# Patient Record
Sex: FEMALE | Race: WHITE | Employment: UNEMPLOYED | URBAN - METROPOLITAN AREA
[De-identification: names, ages, dates, MRNs, and addresses within clinical notes are randomized per-mention and may not be internally consistent; named-entity substitution may affect disease eponyms.]

---

## 2019-03-15 ENCOUNTER — HOSPITAL ENCOUNTER (OUTPATIENT)
Dept: PSYCHIATRY | Age: 29
Setting detail: THERAPIES SERIES
Discharge: HOME | End: 2019-03-15
Payer: COMMERCIAL

## 2019-03-15 PROCEDURE — 90791 PSYCH DIAGNOSTIC EVALUATION: CPT

## 2019-03-15 PROCEDURE — 80305 DRUG TEST PRSMV DIR OPT OBS: CPT

## 2019-03-15 ASSESSMENT — LIFESTYLE VARIABLES: HISTORY_ALCOHOL_USE: NO

## 2019-04-01 ENCOUNTER — HOSPITAL ENCOUNTER (OUTPATIENT)
Dept: PSYCHIATRY | Age: 29
Setting detail: THERAPIES SERIES
Discharge: HOME | End: 2019-04-01
Payer: COMMERCIAL

## 2019-04-01 PROCEDURE — 80305 DRUG TEST PRSMV DIR OPT OBS: CPT

## 2019-04-01 PROCEDURE — H0015 ALCOHOL AND/OR DRUG SERVICES: HCPCS

## 2019-04-02 NOTE — PROGRESS NOTES
Group Therapy Note    Date: 4/1/2019   Start Time: 5:30PM   End Time: 8:00PM  Number of Participants: 5  Support Meetings attended:2     Type of Group: [facility]       Topic: Group 1: Group psychotherapy- Thoughts & feelings - Cravings & Urges               Group 2: Substance use disorder and impact on Self -care       Patient's Goal:  Maintain abstinence/ Develop sober support / Manage cravings & urges , eliminate legal issues from [employer]     Group 1 5:30AM to 6:45 : Client actively engaged in group session. She shared that she came into treatment because she got a legal charge and smokes marijuana. Client stated her last use of marijuana was yesterday and states she smoked a blunt because she uses marijuana to relax and to \" chill \". Client denies having cravings & urges to use marijuana and when cravings & urges are explained to her she states \" I smoke weed because I like to smoke weed\" Client denies that her marijuana use is a problem in her life and became very resistant to hearing feedback about her marijuana use the more questions were explored with her.     Group 2 7:00 to 8:00 : Client actively engaged in group discussion exploring a handout and identifying her self care issues. Client identified her physica helath and gaining weight as a self care issues. Client identified getting back into exorcising as one step sh can take to improve her physical health. Client does not see marijuana use as a problem that can impact her physical health. Urinalysis Submitted Today? Yes    Urinalysis Results: 0    If Positive, for which substance:0    Date of Urinalysis Results Review: 0     Urinalysis Results Signed by Client? No    Status After Intervention:  Unchanged    Participation Level:  Active Listener, Interactive and Minimal    Participation Quality: Appropriate, Attentive, Sharing and Resistant      Speech:  normal      Thought Process/Content: Logical      Affective Functioning: Flat      Mood: irritable      Level of consciousness:  Alert, Oriented x4 and Attentive      Response to Learning: Able to verbalize current knowledge/experience, Able to verbalize/acknowledge new learning, Able to retain information, Capable of insight, Able to change behavior and Resistant      Endings: None Reported    Modes of Intervention: Support, Socialization, Exploration, Clarifying, Problem-solving and Confrontation      Discipline Responsible: [facility]      Signature:  Nahun Salinas

## 2019-04-02 NOTE — PLAN OF CARE
Problem: Substance Abuse:  Goal: Patient specific goal  Description  Patient specific goal  4/2/2019 0916 by Ambrocio Dallas RN  Outcome: Ongoing  Note:   Case Management Goal #1                                                                                                                                                                                                                   Charisse Sanders will meet with the terms of her probation throughout 18-24 sessions in treatment, throughout 12 weeks of after care and on discharge. A. Weekly progress reports to [facility] describing progress toward goal achievement and attendance. Time Frame for Achievement: 6/01/19        B. Weekly urine drug screens, review with Client -Becka Camp. Time Frame for Achievement:6/01/19    C. Explore legal charges and identify 2-4 episodes of substance abuse that related to this legal charge or potential legal involvement. Time Frame for Achievement: 6/01/2019 4/2/2019 0908 by Ambrocio Dallas RN  Outcome: Ongoing  Note:   Patient Specific Goal: Charisse Sanders stated that she has \"no goals for treatment\". Goal 1: Charisse Sanders will achieve uninterrupted sobriety throughout 18-24 sessions of treatment, and 12 weeks of after care and on discharge. A. Submit weekly random urine drug screens, Review weekly with Charisse Sanders and Probation Sayra Perez and have sign. Time frame for achievement: 6/01/19    B. Explore 2-3 attempts at sobriety and identify 2-3 behavioral changes that need to be made. Time frame for achievement: 6/01/19    C. Learn about the disease concept of addiction and share 2-3 insights regarding own usage. Time frame for achievement: 6/01/2019    Goal 2: Sangeetha Richardson will improve and develop an extensive sober support network over the next 18-24 sessions of treatment, 12 weeks of after care and on discharge. A. Attend 2 AA/NA meetings per week over the next 18-24 sessions of treatment and 3 times a week in after care. Time frame for achievement: 6/01/19    B. Acquire 2-6 names and phone numbers of recovering people (same gender) met at each AA/NA meeting. Time frame for achievement: 6/01/19    C. Identify 2-3 individuals not supportive of your recovery efforts and discuss in group therapy over the next 18-24 sessions of treatment. Time frame for achievement: 6/01/19    D. Obtain (1) temporary female sponsor and share 2-3 reasons why you selected this individual as a sponsor to aid your recovery process. Time frame for achievement: 6/01/19    Goal 3: Client will learn to cope with cravings and urges over the next 18-24 sessions without using any alcohol or drugs of abuse and addiction. A. Identify 3-5 past experiences with obsessive thoughts of using drugs and alcohol; share during group process. Time frame for achievement: 6/01/2019    B. List 2-4 events/situations that lead to cravings and urges then identify 2-3 changes that need to be made over the next 18-24 sessions.

## 2019-04-03 ENCOUNTER — HOSPITAL ENCOUNTER (OUTPATIENT)
Dept: PSYCHIATRY | Age: 29
Setting detail: THERAPIES SERIES
Discharge: HOME | End: 2019-04-03
Payer: COMMERCIAL

## 2019-04-03 PROCEDURE — H0015 ALCOHOL AND/OR DRUG SERVICES: HCPCS

## 2019-04-04 ENCOUNTER — HOSPITAL ENCOUNTER (OUTPATIENT)
Dept: PSYCHIATRY | Age: 29
Setting detail: THERAPIES SERIES
Discharge: HOME | End: 2019-04-04
Payer: COMMERCIAL

## 2019-04-04 PROCEDURE — H0005 ALCOHOL AND/OR DRUG SERVICES: HCPCS

## 2019-04-04 PROCEDURE — H0015 ALCOHOL AND/OR DRUG SERVICES: HCPCS

## 2019-04-05 NOTE — PROGRESS NOTES
Congruent      Mood: Stable       Level of consciousness:  Alert, Oriented x4 and Attentive      Response to Learning: Able to verbalize current knowledge/experience, Able to verbalize/acknowledge new learning, Able to retain information, Capable of insight, Able to change behavior and Progressing to goal      Endings: None Reported    Modes of Intervention: Education, Support, Socialization, Exploration, Clarifying, Problem-solving and Media      Discipline Responsible: [facility]      Signature:  Monster Mccoy [facility]

## 2019-04-05 NOTE — H&P
[address]                              HISTORY AND PHYSICAL    PATIENT NAME: Manasa Lechuga                    :        1990  MED REC NO:   [id number]                            ROOM:  ACCOUNT NO:   [de-identified]                           ADMIT DATE: 2019  PROVIDER:     Spring Harrell MD    HISTORY OF PRESENT ILLNESS:  A 47-year-old female from the _____, had  been admitted for the chemical dependency as an outpatient program.  The  patient stated that she has been using marijuana for the last 15 to 16  years, had been smoking it almost everyday. She got into problem with  the police and had to get a drug screen done, which did show positive  marijuana, had been sent in for evaluation and treatment. Does not have  any other major medical problems and no treatment in the past.  She  states that she does not have any psychiatric problems, has not seen the  psychiatrist and not took any psych medications. REVIEW OF SYSTEMS:  HEENT:  Normal.  HEART:  Normal.  LUNGS:  Normal.   ABDOMEN:  Normal.  EXTREMITIES:  Normal.  NEUROLOGIC:  Normal.    FAMILY HISTORY:  No relevant family history. SOCIAL HISTORY:  No relevant social history. PAST MEDICAL HISTORY:  No relevant past medical history. PAST SURGICAL HISTORY:  No relevant surgical history. PHYSICAL EXAMINATION:  GENERAL:  At the time of the admission, the patient is alert and  conscious, in no acute distress, well developed, well nourished. No  signs of anemia. VITAL SIGNS:  Temperature is normal, pulse is 80 per minute,  respirations are 20 per minute, and blood pressure is 120/80. HEENT:  Within normal limits. HEART:  Normal sinus rhythm. No audible murmurs. LUNGS:  Clear, fair, equal, and good air entry. ABDOMEN:  Soft. No organomegaly. No masses. EXTREMITIES:  Within normal limits.   NEUROLOGIC:  The patient is stable. DIAGNOSTIC IMPRESSION:  Chemical dependency. PLAN AND TREATMENT:  The patient is physically able to complete the  treatment program and she does not have any problem with any of the  assessments. Rehabilitation as per the counselor. Aftercare as per the  counselor. Followup with the family physician.         Justo Curran MD    D: 04/04/2019 18:22:40       T: 04/04/2019 20:31:54     [email or web address]  Job#: [id number]     Doc#: [id number]    CC:

## 2019-04-08 ENCOUNTER — HOSPITAL ENCOUNTER (OUTPATIENT)
Dept: PSYCHIATRY | Age: 29
Setting detail: THERAPIES SERIES
Discharge: HOME | End: 2019-04-08
Payer: COMMERCIAL

## 2019-04-08 PROCEDURE — H0015 ALCOHOL AND/OR DRUG SERVICES: HCPCS

## 2019-04-09 NOTE — PROGRESS NOTES
Group Therapy Note    Date: 4/8/2019   Start Time: 5;30PM   End Time: 8:00PM  Number of Participants: 4  Support Meetings attended:2     Type of Group: [facility]      Topic: Group 1: Group Psychotherapy                 Group  2: Developing skills to be emotionally resilient     Patient's Goal:  Maintain abstinence/ Develop sober support / Manage cravings & urges/ Eliminate legal problems from [employer].     Group 1 5:30 to 6:45: Client actively opened in group therapy sharing that she is still frustrated with her boyfriend actions and she shared that he went to a meeting with her over the weekend , however she states she did not share at the support group meeting because her boyfriend wanted to force her to share and she did not feel comfortable. Counsleor explored with client that going to meeting without her boyfriend may be more helpful for her recovery.       Group 2 7:00 to 8:00PM: Client actively identified and shared how changing her perception about her Loralyn Le as a coping skills that  is helping her to accept her treatment and do what she needs to do for herself to complete the program        Urinalysis Submitted Today? No    Urinalysis Results: 0    If Positive, for which substance:0    Date of Urinalysis Results Review: 0     Urinalysis Results Signed by Client? No    Status After Intervention:  Improved    Participation Level:  Active Listener, Interactive and Minimal    Participation Quality: Appropriate, Attentive and Sharing      Speech:  normal      Thought Process/Content: Logical      Affective Functioning: Congruent      Mood: Stable       Level of consciousness:  Alert, Oriented x4 and Attentive      Response to Learning: Able to verbalize current knowledge/experience, Able to verbalize/acknowledge new learning, Able to retain information, Capable of insight and Able to change behavior      Endings: None Reported    Modes of Intervention: Support, Socialization, Exploration, Clarifying and Problem-solving      Discipline Responsible: [facility]      Signature:  Nahun Cali

## 2019-04-10 ENCOUNTER — HOSPITAL ENCOUNTER (OUTPATIENT)
Dept: PSYCHIATRY | Age: 29
Setting detail: THERAPIES SERIES
Discharge: HOME | End: 2019-04-10
Payer: COMMERCIAL

## 2019-04-10 PROCEDURE — H0015 ALCOHOL AND/OR DRUG SERVICES: HCPCS

## 2019-04-11 ENCOUNTER — HOSPITAL ENCOUNTER (OUTPATIENT)
Dept: PSYCHIATRY | Age: 29
Setting detail: THERAPIES SERIES
Discharge: HOME | End: 2019-04-11
Payer: COMMERCIAL

## 2019-04-11 PROCEDURE — H0015 ALCOHOL AND/OR DRUG SERVICES: HCPCS

## 2019-04-11 PROCEDURE — 80305 DRUG TEST PRSMV DIR OPT OBS: CPT

## 2019-04-12 NOTE — PROGRESS NOTES
Group Therapy Note      Date: 4/11/2019    Start Time: 5:30 PM   End Time: 8:00PM  Number of Participants: 4  Support Meetings attended:0     Type of Group: IOP       Topic: Group 1  Group  Psychotherapy                Group  2: Values and Personal responsibility      Patient's Goal:  Maintain abstinence/ Develop sober support / Manage cravings & urges/ Eliminate legal problems from [employer].     Group 1 5:30 to 6:45PM: Client actively participated in group discussion and she shared her challenges this week in recovery are learning how to stay focused on her goal of getting back to her family in [place] by staying sober , and not allowing her living situation get the best of her. Client reports that going to work and coming to treatment is helping her to provide structure in her life, and made her think about her past negative lifestyle that she needs to change.     Group 2 7:00 to 8:00PM: Client actively participated in group discussion and she identified that she values her children. She explored her feelings of past anger and resentments have caused her to make poor choices that led to substance use. She he identified keeping his sobriety is taking personal responsibility to aid his recovery and is a  benefit his family. Client presented pleasant and engaged      Urinalysis Submitted Today? Yes    Urinalysis Results: positive    If Positive, for which substance:marijuana with decreasing level     Date of Urinalysis Results Review: 4/1/2019      Urinalysis Results Signed by Client? Yes    Status After Intervention:  Improved    Participation Level:  Active Listener and Interactive    Participation Quality: Appropriate, Attentive and Sharing      Speech:  normal      Thought Process/Content: Logical      Affective Functioning: Congruent      Mood: Stable       Level of consciousness:  Alert, Oriented x4 and Attentive      Response to Learning: Able to

## 2019-04-15 ENCOUNTER — HOSPITAL ENCOUNTER (OUTPATIENT)
Dept: PSYCHIATRY | Age: 29
Setting detail: THERAPIES SERIES
Discharge: HOME | End: 2019-04-15
Payer: COMMERCIAL

## 2019-04-15 PROCEDURE — H0015 ALCOHOL AND/OR DRUG SERVICES: HCPCS

## 2019-04-16 NOTE — PROGRESS NOTES
Group Therapy Note    Date: 4/15/2019    Start Time: 5:30PM   End Time: 8:00PM  Number of Participants: 5  Support Meetings attended:2     Type of Group: IOP      Topic: Group 1 Group Psychotherapy               Group  2 Identifying Skills to Prevent relapse       Patient's Goal:  Maintain abstinence/ Develop sober support / Manage cravings & urges/ Eliminate legal problems from [employer].     Group 1 5:30 to 6:30PM: Benjamin Miller actively participated in group therapy she shared how not smoking marijuana is allowing her to have clearer  Thoughts. She stated she is developing a  more positive outlook on her life and realizing that marijuana is not more important that her responsiblities Trevor Sneed presented appropriate and calm     Group 2 6:50 to 8:00PM: Benjamin Miller actively participated in group therapy session and gained insight from education. She identified not associating with using peers and focusing on herself by taking care of her responsibilities as coping skills to aid her recovery. Status After Intervention:  Improved    Participation Level:  Active Listener, Interactive and Minimal    Participation Quality: Appropriate, Attentive and Sharing      Speech:  normal      Thought Process/Content: Logical      Affective Functioning: Congruent      Mood: Stable       Level of consciousness:  Alert, Oriented x4 and Attentive      Response to Learning: Able to verbalize current knowledge/experience, Able to verbalize/acknowledge new learning, Able to retain information, Capable of insight, Able to change behavior and Progressing to goal      Endings: None Reported    Modes of Intervention: Education, Support, Socialization, Exploration, Clarifying, Problem-solving and Media      Discipline Responsible: [facility]      Signature:  Nahun James

## 2019-04-17 ENCOUNTER — HOSPITAL ENCOUNTER (OUTPATIENT)
Dept: PSYCHIATRY | Age: 29
Setting detail: THERAPIES SERIES
Discharge: HOME | End: 2019-04-17
Payer: COMMERCIAL

## 2019-04-17 PROCEDURE — H0015 ALCOHOL AND/OR DRUG SERVICES: HCPCS

## 2019-04-18 ENCOUNTER — HOSPITAL ENCOUNTER (OUTPATIENT)
Dept: PSYCHIATRY | Age: 29
Setting detail: THERAPIES SERIES
Discharge: HOME | End: 2019-04-18
Payer: COMMERCIAL

## 2019-04-18 PROCEDURE — 80305 DRUG TEST PRSMV DIR OPT OBS: CPT

## 2019-04-18 PROCEDURE — H0015 ALCOHOL AND/OR DRUG SERVICES: HCPCS

## 2019-04-18 NOTE — GROUP NOTE
Number of participants:2  Type of group: Recovery  Mode of intervention: Exploration, Clarifying, Problem-solving, Confrontation and Reality-testing  Topic: Stages of Recovery   Objective: To assist client with identifying what stage of recovery they are in and lifestyle changes that need improved to get to the next stage of recovery . Note: Aurelio Neumann identified being in abstinence phase of recovery and explored that she is seeing results in her drug screens and identified that one change she is making to get to the lifestyle phase is making better choices with relationships by eliminating using peers. Status after intervention:Improved  Participation level:  Active Listener and Interactive  Participation Quality: Appropriate, Attentive and Sharing  Speech: normal  Thought Process/Content:Logical  Mood/Affect: calm and congruent  Self report: None Reported  Response to learning: Able to verbalize current knowledge/experience, Able to verbalize/acknowledge new learning, Able to retain information, Capable of insight, Able to change behavior and Progressing to goal  Discipline Responsible: [place]

## 2019-04-18 NOTE — GROUP NOTE
Number of participants:2  Type of group: Psychotherapy  Mode of intervention: Support, Exploration, Clarifying, Problem-solving, Confrontation and Reality-testing  Topic: Challenges in Relationships that impact Recovery   Objective: To assist client with developing conflict resolution skills to reduce stress on recovery process and aid improving relationships harmed by addiction    Note: Padmini Antonio shared  feelings of stress related to boyfriends infidelity. Padmini Antonio processed and explored alternative options and choices she can  apply to aid making positive choices to support her recovery process. Status after intervention:Improved  Participation level:  Active Listener and Interactive  Participation Quality: Appropriate, Attentive and Sharing  Speech: normal  Thought Process/Content:Logical  Mood/Affect: irritable and congruent  Self report: None Reported  Response to learning: Able to verbalize current knowledge/experience, Able to verbalize/acknowledge new learning, Able to retain information, Capable of insight, Able to change behavior and Progressing to goal  Discipline Responsible: [place]

## 2019-04-19 NOTE — GROUP NOTE
Number of participants:2  Type of group: Psychotherapy  Mode of intervention: Support, Exploration, Clarifying, Problem-solving, Confrontation and Reality-testing  Topic: Character defects that hinder recovery process  Objective: To assist client with identifying personality defects and behaviors that hinder recovery process and gain insight and knowledge to change the behaviors. Note: Jaja Gamez actively engaged in group session and was able to identify how feelings of anger and being vengeful and defects that hinder her recovery efforts and influence poor choices. Susi Mccormick explored 2-3 new positive coping skills to utlize when react to angry feelings. Status after intervention:Improved  Participation level:  Active Listener and Interactive  Participation Quality: Appropriate, Attentive and Sharing  Speech: normal  Thought Process/Content:Logical  Mood/Affect: congruent  Self report: None Reported  Response to learning: Able to verbalize current knowledge/experience, Able to verbalize/acknowledge new learning, Able to retain information, Capable of insight, Able to change behavior and Progressing to goal  Discipline Responsible: [place]

## 2019-04-22 ENCOUNTER — HOSPITAL ENCOUNTER (OUTPATIENT)
Dept: PSYCHIATRY | Age: 29
Setting detail: THERAPIES SERIES
Discharge: HOME | End: 2019-04-22
Payer: COMMERCIAL

## 2019-04-22 PROCEDURE — 80305 DRUG TEST PRSMV DIR OPT OBS: CPT

## 2019-04-22 PROCEDURE — H0015 ALCOHOL AND/OR DRUG SERVICES: HCPCS

## 2019-04-23 NOTE — GROUP NOTE
Number of participants:3  Type of group: Relapse Prevention   Mode of intervention: Support, Exploration, Clarifying, Media, Problem-solving, and Reality-testing  Topic: Peer Influence    Objective: To become aware of peer influence and explore how negative peers can hinder recovery efforts. Note: Linnea Gallegos actively engaged in group session. She presented appropriate and cooperative. Linnea Gallegos identified and explored how being with peers that were negative caused her to make poor choices that led to drug use and criminal activity . Linnea Gallegos stated  That she can't keep taking her children through poverty because of her choices of not being responsible. Status after intervention:Improved  Participation level:  Active Listener and Interactive  Participation Quality: Appropriate, Attentive and Sharing  Speech: normal  Thought Process/Content:Logical  Mood/Affect: congruent  Self report: None Reported  Response to learning: Able to verbalize current knowledge/experience, Able to verbalize/acknowledge new learning, Able to retain information, Capable of insight, Able to change behavior and Progressing to goal  Discipline Responsible: [place]

## 2019-04-23 NOTE — GROUP NOTE
Number of participants:3  Type of group: Psychotherapy  Mode of intervention: Support, Exploration, Clarifying, Problem-solving, Confrontation and Reality-testing  Topic: Managing daily life stressors in recovery   Objective: To practice communicating daily stressors and develop coping skills to manage without substance use    Note: Brad Schrader actively engaged in group session and she reports that she and her significant other were getting along and she is learning to cope with her stressors at home by walking away from arguments, or keeping busy with going to work , and finding other things to occupy her mind to avoid conflicts and reduce daily stress in her life. Status after intervention:Improved  Participation level:  Active Listener and Interactive  Participation Quality: Appropriate, Attentive and Sharing  Speech: normal  Thought Process/Content:Logical  Mood/Affect: congruent  Self report: None Reported  Response to learning: Able to verbalize current knowledge/experience, Able to verbalize/acknowledge new learning, Able to retain information, Capable of insight, Able to change behavior and Progressing to goal  Discipline Responsible: [place]

## 2019-04-24 ENCOUNTER — HOSPITAL ENCOUNTER (OUTPATIENT)
Dept: PSYCHIATRY | Age: 29
Setting detail: THERAPIES SERIES
Discharge: HOME | End: 2019-04-24
Payer: COMMERCIAL

## 2019-04-24 PROCEDURE — H0015 ALCOHOL AND/OR DRUG SERVICES: HCPCS

## 2019-04-24 NOTE — GROUP NOTE
Number of participants:4  Type of group: Psychotherapy  Mode of intervention: Support, Exploration, Clarifying, Problem-solving and Reality-testing  Topic: Psychotherapy   Objective: To explore uncomfortable challenges in recovery process to gain growth and explore strengths in recovery process. Note: Suly Chase actively participated in group session and she presented calm and appropriate. Kishor Sheehan identified and explored that growth in her recovery would be removing herself from a toxic relationship . Suly Chase identified how the relationship has been negative , but states she is not ready to let go because she does not want to be alone. Counselor processed benefits to recovery removing toxi relationship. Status after intervention:Unchanged  Participation level:  Active Listener and Interactive  Participation Quality: Appropriate, Attentive and Sharing  Speech: normal  Thought Process/Content:Logical  Mood/Affect: calm and congruent  Self report: None Reported  Response to learning: Able to verbalize current knowledge/experience, Able to verbalize/acknowledge new learning, Able to retain information, Capable of insight, Able to change behavior and Resistant  Discipline Responsible: [place]

## 2019-04-25 ENCOUNTER — HOSPITAL ENCOUNTER (OUTPATIENT)
Dept: PSYCHIATRY | Age: 29
Setting detail: THERAPIES SERIES
Discharge: HOME | End: 2019-04-25
Payer: COMMERCIAL

## 2019-04-25 PROCEDURE — H0015 ALCOHOL AND/OR DRUG SERVICES: HCPCS

## 2019-04-25 NOTE — GROUP NOTE
Number of participants:4  Type of group: Relapse Prevention  Mode of intervention: Education, Support, Exploration, Clarifying, Problem-solving, Confrontation and Reality-testing  Topic: Disease concept of substance use   Objective:  Exploring patterns of substance use and how life events fit the patterns of use      Note: Ankur actively engaged in group therapy session. She presented guarded and appropriate. Ankur identified and explored how she loss control of her life by drug use and being addicted to the lifestyle of criminal activity that led to loss of her children. Status after intervention:Improved  Participation level:  Active Listener, Interactive and Minimal  Participation Quality: Appropriate, Attentive and Sharing  Speech: normal  Thought Process/Content:Logical  Mood/Affect: congruent  Self report: None Reported  Response to learning: Able to verbalize current knowledge/experience, Able to verbalize/acknowledge new learning, Able to retain information, Capable of insight, Able to change behavior and Progressing to goal  Discipline Responsible: [place]

## 2019-04-26 NOTE — GROUP NOTE
Number of participants:4  Type of group: Psychotherapy  Mode of intervention: Support, Socialization, Media , Exploration, Clarifying, Problem-solving, Confrontation, Limit-setting and Reality-testing  Topic: Interpersonal Psychotherapy   Objective: To explore interpersonal  barriers to treatment that hinder maintaining sobriety. Note: Raquel Kaur presented uncooperative and defensive during group session. She decompensated and started to awfulizing positive changes she has made trying to fit in with negative peers in the group. Raquel Kaur started talking over the psychoedcation and was re-directed , however she continued to be unable to be re-directed and was dismissed from the session. She was advised her behaviors of disrupting therapeutic milieu will not be accepted.     Status after intervention:Decompensated  Participation level: Interactive and Monopolizing  Participation Quality: Inappropriate, Intrusive and Resistant  Speech: normal and loud  Thought Process/Content:Logical  Flight of ideas  Mood/Affect: irritable and angry  Self report: None Reported  Response to learning: Resistant  Discipline Responsible: [place]

## 2019-04-29 ENCOUNTER — HOSPITAL ENCOUNTER (OUTPATIENT)
Dept: PSYCHIATRY | Age: 29
Setting detail: THERAPIES SERIES
Discharge: HOME | End: 2019-04-29
Payer: COMMERCIAL

## 2019-04-29 ENCOUNTER — APPOINTMENT (OUTPATIENT)
Dept: PSYCHIATRY | Age: 29
End: 2019-04-29
Payer: COMMERCIAL

## 2019-04-29 PROCEDURE — H0015 ALCOHOL AND/OR DRUG SERVICES: HCPCS

## 2019-04-30 NOTE — GROUP NOTE
Number of participants:5  Type of group: Psychotherapy  Mode of intervention: Support, Exploration, Clarifying, Problem-solving, Confrontation and Reality-testing  Topic: Psychotherapy   Objective: To increase well- being and develop coping skills as it relates to managing emotions to support the recovery process. Note: Elias Wooten presented quiet and cooperative. She  actively listened to group members sharing and during break she spoke to counselor individually and stated that she was pregnant and stated she has lots of emotions about her pregnancy that she is not ready to discuss. Counselor discussed proper medical care and gave client referral to community care and will refer to gynecologist on nest session. Status after intervention:Improved  Participation level:  Active Listener, Interactive and Minimal  Participation Quality: Appropriate and Attentive  Speech: normal  Thought Process/Content:Logical  Mood/Affect: quiet and blunt  Self report: None Reported  Response to learning: Able to retain information, Capable of insight and Able to change behavior  Discipline Responsible: [place]

## 2019-05-01 ENCOUNTER — HOSPITAL ENCOUNTER (OUTPATIENT)
Dept: PSYCHIATRY | Age: 29
Setting detail: THERAPIES SERIES
Discharge: HOME | End: 2019-05-01
Payer: COMMERCIAL

## 2019-05-01 ENCOUNTER — APPOINTMENT (OUTPATIENT)
Dept: PSYCHIATRY | Age: 29
End: 2019-05-01
Payer: COMMERCIAL

## 2019-05-01 PROCEDURE — 80305 DRUG TEST PRSMV DIR OPT OBS: CPT

## 2019-05-01 PROCEDURE — H0015 ALCOHOL AND/OR DRUG SERVICES: HCPCS

## 2019-05-01 NOTE — GROUP NOTE
Number of participants:5  Type of group: Psychotherapy  Mode of intervention: Support, Socialization, Exploration, Clarifying, Problem-solving, Confrontation and Reality-testing  Topic: Psychotherapy  Objective: To explore copings skills to enhance well- being , and explore benefits of recovery and identfy how relationhsips and lifestyle patterns are improving or decompensating as it relates to recovery. Note: Francisco Javier Bray was active in group therapy and she presented appropriate and cooperative. She shared that since she has been sober she has more energy and her focus as changed to making a better life for herself instead of using drugs. Francisco Javier Bray was provided referral to obstetrician and she reported she followed up with mental health counseling and has appointment on May 16 th .    Status after intervention:Improved  Participation level:  Active Listener and Interactive  Participation Quality: Appropriate, Attentive and Sharing  Speech: normal  Thought Process/Content:Logical  Mood/Affect: congruent  Self report: None Reported  Response to learning: Able to verbalize current knowledge/experience, Able to verbalize/acknowledge new learning, Able to retain information, Capable of insight, Able to change behavior and Progressing to goal  [facility]

## 2019-05-02 ENCOUNTER — HOSPITAL ENCOUNTER (OUTPATIENT)
Dept: PSYCHIATRY | Age: 29
Setting detail: THERAPIES SERIES
Discharge: HOME | End: 2019-05-02
Payer: COMMERCIAL

## 2019-05-02 ENCOUNTER — APPOINTMENT (OUTPATIENT)
Dept: PSYCHIATRY | Age: 29
End: 2019-05-02
Payer: COMMERCIAL

## 2019-05-02 PROCEDURE — H0015 ALCOHOL AND/OR DRUG SERVICES: HCPCS

## 2019-05-02 NOTE — GROUP NOTE
Number of participants:5  Type of group: Psychoeducation  Mode of intervention: Education, Support, Socialization, Exploration, Clarifying, Problem-solving, Media, Confrontation and Reality-testing  Topic: The effects of Drugs and alcohol   Objective: To explore the risks ,dangers, and ripple effects that substance use has on the substance user , community , and family    Note: Raquel Kaur actively participated in group session . She identified and explored how her substance use negatively impacted herself esteem , choices that led to unhealthy relationships, and violent behavior towards others that led to the losing custody of her daughter. Status after intervention:Improved  Participation level:  Active Listener and Interactive  Participation Quality: Appropriate, Attentive and Sharing  Speech: normal  Thought Process/Content:Logical  Mood/Affect: congruent  Self report: None Reported  Response to learning: Able to verbalize current knowledge/experience, Able to verbalize/acknowledge new learning, Able to retain information, Capable of insight, Able to change behavior and Progressing to goal  Discipline Responsible: [place]

## 2019-05-06 ENCOUNTER — HOSPITAL ENCOUNTER (OUTPATIENT)
Dept: PSYCHIATRY | Age: 29
Setting detail: THERAPIES SERIES
Discharge: HOME | End: 2019-05-06
Payer: COMMERCIAL

## 2019-05-06 ENCOUNTER — APPOINTMENT (OUTPATIENT)
Dept: PSYCHIATRY | Age: 29
End: 2019-05-06
Payer: COMMERCIAL

## 2019-05-06 PROCEDURE — H0015 ALCOHOL AND/OR DRUG SERVICES: HCPCS

## 2019-05-06 NOTE — GROUP NOTE
Number of participants:3  Type of group: Recovery  Mode of intervention: Support, Socialization, Exploration, Clarifying, Problem-solving and Reality-testing  Topic: Guest Speaker ( Volunteer from [facility] in [facility]) - Relapse Prevention   Objective: To help clients confront and analyze what they do see in themselves related to guest speaker and learn to view themselves more positively    Note: Enrike Quintana actively engaged in group session. She presented cooperative and appropriate. Enrike Quintana shared how she related with speaker by sharing her current issues and stressors of being pregnant and in a relationships with someone she is unsure will stay committed to her based on their past history of infidelity . She identified that she must stay sober through her challenges in her relationship for her unborn child. Status after intervention:Improved  Participation level:  Active Listener and Interactive  Participation Quality: Appropriate, Attentive and Sharing  Speech: normal  Thought Process/Content:Logical  Mood/Affect: congruent  Self report: None Reported  Response to learning: Able to verbalize current knowledge/experience, Able to verbalize/acknowledge new learning, Able to retain information, Capable of insight, Able to change behavior and Progressing to goal  Discipline Responsible: [place]

## 2019-05-07 NOTE — GROUP NOTE
Number of participants:5  Type of group: Psychotherapy  Mode of intervention: Support, Socialization, Exploration, Clarifying, Problem-solving, Confrontation and Reality-testing  Topic: Psychotherapy   Objective: To initiate a sense of belonging and relatability through sharing experiences    Note: Jaja Gamez actively participated in group session and she presented pleasant and cooperative. Jaja Gamez shared that she was disappointed because she was not able to buy a car over the weekend due past legal issues. She stated that she was angry and frustrated and shared how she turned her negatives into a positive by taking her money and paying off her fines. She shared that she feels somewhat accomplished and that she was making progress as it relates to eliminating legal problems. Status after intervention:Improved  Participation level:  Active Listener and Interactive  Participation Quality: Appropriate, Attentive and Sharing  Speech: normal  Thought Process/Content:Logical  Mood/Affect: brightens and congruent  Self report: None Reported  Response to learning: Able to verbalize current knowledge/experience, Able to verbalize/acknowledge new learning, Able to retain information, Capable of insight, Able to change behavior and Progressing to goal  Discipline Responsible: [place]

## 2019-05-07 NOTE — GROUP NOTE
Number of participants:5  Type of group: Psychoeducation  Mode of intervention: Education, Support, Exploration, Clarifying, Problem-solving, Media and Reality-testing  Topic: The effects of Alcohol on mental and Physical health  Objective: To educate clients on the risk and dangers associated with alcohol use , misuse,abuse, and dependence. Note: Ty Rivera actively participated in group session and she presented appropriate and cooperative. Ty Rivera  related to the eduction based on physically being sick and vomiting and reported in her past she has alcohol poisoning and was placed in the hospital. She stated after the incident she stopped drinking alcohol and her addiction to [address] [place] [address] progressed because she was never physically sick while using marijuana. Status after intervention:Improved  Participation level:  Active Listener and Interactive  Participation Quality: Appropriate, Attentive, Sharing and Supportive  Speech: normal  Thought Process/Content:Logical  Mood/Affect: congruent  Self report: None Reported  Response to learning: Able to verbalize current knowledge/experience, Able to verbalize/acknowledge new learning, Able to retain information, Capable of insight, Able to change behavior and Progressing to goal  Discipline Responsible: [place]

## 2019-05-08 ENCOUNTER — HOSPITAL ENCOUNTER (OUTPATIENT)
Dept: PSYCHIATRY | Age: 29
Setting detail: THERAPIES SERIES
Discharge: HOME | End: 2019-05-08
Payer: COMMERCIAL

## 2019-05-08 ENCOUNTER — APPOINTMENT (OUTPATIENT)
Dept: PSYCHIATRY | Age: 29
End: 2019-05-08
Payer: COMMERCIAL

## 2019-05-08 PROCEDURE — H0015 ALCOHOL AND/OR DRUG SERVICES: HCPCS

## 2019-05-08 NOTE — PROGRESS NOTES
Number of participants:5  Type of group: Psychotherapy  Mode of intervention: Support, Socialization, Exploration, Clarifying, Problem-solving and Reality-testing  Topic: Psychotherapy   Objective:  To aid client with improving sense of well - being by learning how to express their thoughts ,feelings, challenges with others and to be able accept criticism from peers.     Note: Luz Elena presented stressed but coopertaive. She shared that she feels irritated and tired of having to carry the financial stress of her household and upset that her boyfriend is not contributing to the household with any contribution and accusing her of cheating. Counselor and group member s identiifed resources  to remove herself from the relationships and she appeared recoetive of making change to aid reducing relationships stress. Status after intervention:Improved  Participation level:  Active Listener and Interactive  Participation Quality: Appropriate, Attentive and Sharing  Speech: normal  Thought Process/Content:Logical  Mood/Affect: irritable and congruent  Self report: None Reported  Response to learning: Able to verbalize current knowledge/experience, Able to verbalize/acknowledge new learning, Able to retain information, Capable of insight and Able to change behavior  Discipline Responsible: [place]

## 2019-05-09 ENCOUNTER — HOSPITAL ENCOUNTER (OUTPATIENT)
Dept: PSYCHIATRY | Age: 29
Setting detail: THERAPIES SERIES
Discharge: HOME | End: 2019-05-09
Payer: COMMERCIAL

## 2019-05-09 ENCOUNTER — APPOINTMENT (OUTPATIENT)
Dept: PSYCHIATRY | Age: 29
End: 2019-05-09
Payer: COMMERCIAL

## 2019-05-09 PROCEDURE — H0015 ALCOHOL AND/OR DRUG SERVICES: HCPCS

## 2019-05-09 PROCEDURE — 80305 DRUG TEST PRSMV DIR OPT OBS: CPT

## 2019-05-13 ENCOUNTER — HOSPITAL ENCOUNTER (OUTPATIENT)
Dept: PSYCHIATRY | Age: 29
Setting detail: THERAPIES SERIES
Discharge: HOME | End: 2019-05-13
Payer: COMMERCIAL

## 2019-05-13 ENCOUNTER — APPOINTMENT (OUTPATIENT)
Dept: PSYCHIATRY | Age: 29
End: 2019-05-13
Payer: COMMERCIAL

## 2019-05-13 NOTE — DISCHARGE SUMMARY
[address]      Client Name: Chicho Conner  Date of Admission: 04/01/2019    Discharge Date: 05/09/2019      Diagnosis: F12.20   Authorized Person's  Name: Hadley Ruiz: JOSELUIS, RN          Date: 5/13/2019      Degree of Severity- ADMISSION  Degree of Severity- DISCHARGE    Acute Intoxication withdrawal None Acute Intoxication withdrawal None   Biomedical Conditions/  Complications None Biomedical Conditions/  Complications moderate   Emotional Behavioral/ Cognitive Conditions None Emotional Behavioral/ Cognitive Conditions None   Treatment Acceptance Resistance low Treatment Acceptance Resistance low   Relapse Potential low Relapse Potential low   Recovery Environment low Recovery Environment low       Comments on Dimensional Criteria: #1 No withdrawals reported on admission or discharge. #2 Client reported she is pregnant #3 Client reports no emotional conditions. #4 Client demonstrated low resistance to the program and he was able accept treatment and utilized the group process effectively. #5 Client has attended the (2) required AA/NA meetings for the treatment requirement and his risk for relapse is low due to client making good progress and demonstrating insight into his poor choices, developing coping skills, and obtaining sponsorship. #6 Clients environment is low due to client reports there is no substance use in his environment. Level of Care and Service Provided during Course of Treatment: Based upon the results of the assessment, which included completion of the admission criteria of the adult protocol for levels of care, client was placed into the IOP program. While involved in IOP the following services were provided: case management, urine drug screening, medical somatic and group counseling. Client's Response to Treatment: Client responded well to the individualized treatment plan that was developed.  Client attended the sessions as required, developed skills to make healthy choices to avoid abusing substances and avoid all illicit substance use. Client is still seeking a sponsor and working to increase his sober supports being active in the recovery community. Recommendations and/or Referral for Additional AOD Addiction Treatment or other services: Client met [facility] protocol for transfer to non-intensive outpatient treatment. Client is scheduled to begin non-intensive aftercare group therapy as recommended beginning 5/13/2019. Client is recommended to start attending 3 sober support group meetings each week to aid in the recovery process.       Electronically signed by Ewelina Montesinos RN on 5/13/2019 at 8:30 AM

## 2019-05-13 NOTE — PROGRESS NOTES
Cognitive Skills / Relapse Prevention / Recovery Group Note    Client Name: Jose Ruff        Type(s) of Service Delivery:    44354 Group Therapy 80 Min  Case Mgmt  60471 Urinalysis   x 10980 Group Therapy 60 x1  85751 Individual/Family 30 Min  34050 Urinalysis (Tramadol)    Group Therapy 30 Min  63458 Individual/Family 61 Min  Other       Date:  5/13/2019      Start Time: 0768       End Time: 2000    Number Participants in Group:  4    Client Attended Support Group Meeting:  No    If so, how many meetings since last treatment session? 0    Change Noted in Client's behavior, action, and/or statements as it relates to harming self or other? no        Goal:  Patient will demonstrate uninterrupted sobriety throughout 12 recommended sessions of treatment          Topic: Developing skills to maintain sobriety. Participation Level:     None  Minimal   x Active Listener x Interactive    Monopolizing         Participation Quality:   x Appropriate  Inappropriate   x       Attentive        Intrusive   x       Sharing        Resistant          Supportive        Lethargic       Affective:    x Congruent  Incongruent  Blunted  Flat    Constricted  Anxious  Elated  Angry    Labile  Depressed  Other         Cognitive:   x Alert x Oriented PPTP         Modes of Intervention:    Education  Support  Exploration    Clarifying x Person-Centered  Confrontation    Socialization x Cognitive Behavioral  Reality Testing    Activity  Movement  Media    Other:   Urinalysis  Motivational Interviewing     Urinalysis Submitted Today? No    Urinalysis Results: None    If Positive, for which substance:    Date of Urinalysis Results Review: Urinalysis Results Signed by Client?  Yes, 5/09/2019        Response to Learning:  x Able to verbalize current knowledge/experience    Able to verbalize/acknowledge new learning    Able to retain information    Capable of insight    Able to change behavior    Progressing to goal    Other: Topics for today's group were: Daily Reflection Reading from [address] for [address] The progression of recovery is a continuous uphill journey      Brief Description of Treatment Provided, including clients response to treatment:    Hour 1: Howard Jean reports continued sobriety and denies having any cravings & urges to use substances. Counselor provided daily reflection reading on the progression of recovery is a continuous uphill journey. Howard Jaen stated in group she identified how what she does daily will effect her recovery. Howard Jean stated she is using the people she is meeting in her meetings to help her progression in her recovery.             Significant Changes or Events in the Client's Life Since Last Treatment Session? no    Level of Care Review Done? no    Recommend Changes to Client's Treatment Plan? no    Counselour Signature/Credentials: Kirill Kraft RN     Date/Time: 5/13/2019    Client to Counselor Ratio: 4:1

## 2019-05-15 ENCOUNTER — APPOINTMENT (OUTPATIENT)
Dept: PSYCHIATRY | Age: 29
End: 2019-05-15
Payer: COMMERCIAL

## 2019-05-16 ENCOUNTER — APPOINTMENT (OUTPATIENT)
Dept: PSYCHIATRY | Age: 29
End: 2019-05-16
Payer: COMMERCIAL

## 2019-05-20 ENCOUNTER — HOSPITAL ENCOUNTER (OUTPATIENT)
Dept: PSYCHIATRY | Age: 29
Setting detail: THERAPIES SERIES
Discharge: HOME | End: 2019-05-20
Payer: COMMERCIAL

## 2019-05-20 ENCOUNTER — APPOINTMENT (OUTPATIENT)
Dept: PSYCHIATRY | Age: 29
End: 2019-05-20
Payer: COMMERCIAL

## 2019-05-20 PROCEDURE — 80305 DRUG TEST PRSMV DIR OPT OBS: CPT

## 2019-05-20 NOTE — PROGRESS NOTES
Cognitive Skills / Relapse Prevention / Recovery Group Note    Client Name: Thai Griffin        Type(s) of Service Delivery:    90424 Group Therapy 90 Min  Case Mgmt  30613 Urinalysis   X 62637 Group Therapy 60   91455 Individual/Family 30 Min  09778 Urinalysis (Tramadol)    Group Therapy 30 Min  78375 Individual/Family 61 Min  Other       Date:  5/20/2019      Start Time: 6711       End Time: 5331    Number Participants in Group:  2    Client Attended Support Group Meeting:  Yes    If so, how many meetings since last treatment session? 2    Change Noted in Client's behavior, action, and/or statements as it relates to harming self or other? no        Goal:  Patient will demonstrate uninterrupted sobriety throughout 12 recommended sessions of treatment          Topic: Developing skills to maintain sobriety. Participation Level:    None  Minimal   x Active Listener x Interactive    Monopolizing         Participation Quality:   x Appropriate  Inappropriate   x       Attentive        Intrusive   x       Sharing        Resistant          Supportive        Lethargic       Affective:    x Congruent  Incongruent  Blunted  Flat    Constricted  Anxious  Elated  Angry    Labile  Depressed  Other         Cognitive:   x Alert x Oriented PPTP         Modes of Intervention:    Education  Support  Exploration    Clarifying x Person-Centered  Confrontation    Socialization x Cognitive Behavioral  Reality Testing    Activity  Movement  Media    Other:   Urinalysis  Motivational Interviewing     Urinalysis Submitted Today? Yes    Urinalysis Results: None    If Positive, for which substance:    Date of Urinalysis Results Review: Urinalysis Results Signed by Client?  No        Response to Learning:   Able to verbalize current knowledge/experience    Able to verbalize/acknowledge new learning    Able to retain information    Capable of insight    Able to change behavior    Progressing to goal    Other:          Topics for today's group were: Explore and identify triggers, cravings, negative relationships and tools of recovery. Brief Description of Treatment Provided, including clients response to treatment:    Hour 1: Eulogio Light actively participated in group session. She reported continued sobriety and presented appropriate and cooperative. Margurette Goldberg shared how sobriety is helping herself a better life. She stated she would like to improve her relationships with children. She is more accountable today and taking care of all her legal affairs.             Significant Changes or Events in the Client's Life Since Last Treatment Session? no    Level of Care Review Done? no    Recommend Changes to Client's Treatment Plan? no    Counselour Signature/Credentials: Liliya Peck RN     Date/Time: 5/20/2019    Client to Counselor Ratio: 2:1

## 2019-05-22 ENCOUNTER — APPOINTMENT (OUTPATIENT)
Dept: PSYCHIATRY | Age: 29
End: 2019-05-22
Payer: COMMERCIAL

## 2019-05-23 ENCOUNTER — APPOINTMENT (OUTPATIENT)
Dept: PSYCHIATRY | Age: 29
End: 2019-05-23
Payer: COMMERCIAL

## 2019-06-03 ENCOUNTER — HOSPITAL ENCOUNTER (OUTPATIENT)
Dept: PSYCHIATRY | Age: 29
Setting detail: THERAPIES SERIES
Discharge: HOME | End: 2019-06-03
Payer: COMMERCIAL

## 2019-06-03 PROCEDURE — 80305 DRUG TEST PRSMV DIR OPT OBS: CPT

## 2019-06-03 PROCEDURE — H0005 ALCOHOL AND/OR DRUG SERVICES: HCPCS

## 2019-06-03 NOTE — GROUP NOTE
Number of participants: 7  Type of group: Psychotherapy  Mode of intervention: Education, Support, Socialization, Exploration, Clarifying, Problem-solving, Confrontation and Limit-setting  Topic: Interpersonal Psychotherapy  Objective: Explore and identify triggers, cravings, repairing relationships and tools of recovery.     Note: Luz Elena actively participated in group therapy today. She was open and explored how staying sober and being more responsible is improving her life and she reports paying off 1200 dollars in fines and gaining her driving permit from the courts as rewards of being compliant and working her program as suggested to aid her recovery process. Status after intervention:Improved  Participation level:  Active Listener and Interactive  Participation Quality: Appropriate, Attentive and Sharing  Speech: normal  Thought Process/Content:Logical  Mood/Affect: congruent  Self report: None Reported  Response to learning: Able to verbalize current knowledge/experience, Able to verbalize/acknowledge new learning, Able to retain information, Capable of insight, Able to change behavior and Progressing to goal  Discipline Responsible: [place]

## 2019-06-05 NOTE — CARE COORDINATION
Client discussed in treatment team today  Present: Julia Ortega  Recommendation:Client: Reviewed and discussed client is making good progress and meeting her treatment goals. No new problems.

## 2019-06-17 ENCOUNTER — HOSPITAL ENCOUNTER (OUTPATIENT)
Dept: PSYCHIATRY | Age: 29
Setting detail: THERAPIES SERIES
Discharge: HOME | End: 2019-06-17
Payer: COMMERCIAL

## 2019-06-17 PROCEDURE — 80305 DRUG TEST PRSMV DIR OPT OBS: CPT

## 2019-06-17 NOTE — PROGRESS NOTES
Cognitive Skills / Relapse Prevention / Recovery Group Note    Client Name: Toño Rico        Type(s) of Service Delivery:    24016 Group Therapy 80 Min  Case Mgmt  73465 Urinalysis   x 73647 Group Therapy 60 x1  81408 Individual/Family 30 Min  63650 Urinalysis (Tramadol)    Group Therapy 30 Min  48716 Individual/Family 61 Min  Other       Date:  6/17/2019      Start Time: 2757       End Time: 0720    Number Participants in Group:  4    Client Attended Support Group Meeting:  Yes    If so, how many meetings since last treatment session? 2    Change Noted in Client's behavior, action, and/or statements as it relates to harming self or other? no        Goal:  Patient will demonstrate uninterrupted sobriety throughout 12 recommended sessions of treatment          Topic: Developing skills to maintain sobriety. Participation Level:     None  Minimal   x Active Listener x Interactive    Monopolizing         Participation Quality:   x Appropriate  Inappropriate   x       Attentive        Intrusive   x       Sharing        Resistant          Supportive        Lethargic       Affective:    x Congruent  Incongruent  Blunted  Flat    Constricted  Anxious  Elated  Angry    Labile  Depressed  Other         Cognitive:   x Alert  Oriented PPTP         Modes of Intervention:    Education  Support  Exploration    Clarifying  Person-Centered  Confrontation    Socialization x Cognitive Behavioral  Reality Testing    Activity  Movement  Media    Other:   Urinalysis  Motivational Interviewing     Urinalysis Submitted Today? Yes    Urinalysis Results: None    If Positive, for which substance:    Date of Urinalysis Results Review: Urinalysis Results Signed by Client?  Yes        Response to Learning:   Able to verbalize current knowledge/experience    Able to verbalize/acknowledge new learning    Able to retain information    Capable of insight    Able to change behavior    Progressing to goal    Other:          Topics for today's group were: Identify and explore repairing relationships and building sober support. Brief Description of Treatment Provided, including clients response to treatment:    Hour 1: Brad Schrader actively participated in group session. She reported continued sobriety and presented appropriate and cooperative. She identified repairing relationship with her boyfriend. She also identified her work peers as sober support.       Significant Changes or Events in the Client's Life Since Last Treatment Session? no    Level of Care Review Done? no    Recommend Changes to Client's Treatment Plan? no    Counselour Signature/Credentials: Evan Davila RN     Date/Time: 6/17/2019    Client to Counselor Ratio: 4:1

## 2019-06-19 NOTE — PLAN OF CARE
Client discussed in treatment team today  Present: Dr. Marlin Gonzalez, [facility]  Recommendation: Client will continue with current treatment plan, client is compliant and agreeable.

## 2019-06-24 ENCOUNTER — HOSPITAL ENCOUNTER (OUTPATIENT)
Dept: PSYCHIATRY | Age: 29
Setting detail: THERAPIES SERIES
Discharge: HOME | End: 2019-06-24
Payer: COMMERCIAL

## 2019-06-24 NOTE — PROGRESS NOTES
Cognitive Skills / Relapse Prevention / Recovery Group Note    Client Name: Toño Rico        Type(s) of Service Delivery:    95125 Group Therapy 80 Min  Case Mgmt  01153 Urinalysis   x 70479 Group Therapy 60 x1  47255 Individual/Family 30 Min  89875 Urinalysis (Tramadol)    Group Therapy 30 Min  95252 Individual/Family 61 Min  Other       Date: 6/24/2019       Start Time: 5408       End Time: 9228    Number Participants in Group:  3    Client Attended Support Group Meeting:  Yes    If so, how many meetings since last treatment session? 2    Change Noted in Client's behavior, action, and/or statements as it relates to harming self or other? no        Goal:  Patient will demonstrate uninterrupted sobriety throughout 12 recommended sessions of treatment          Topic: Developing skills to maintain sobriety. Participation Level:     None  Minimal   x Active Listener x Interactive    Monopolizing         Participation Quality:  x Appropriate  Inappropriate   x       Attentive        Intrusive   x       Sharing        Resistant          Supportive        Lethargic       Affective:    x Congruent  Incongruent  Blunted  Flat    Constricted  Anxious  Elated  Angry    Labile  Depressed  Other         Cognitive:   x Alert  Oriented PPTP         Modes of Intervention:    Education  Support  Exploration    Clarifying x Person-Centered  Confrontation    Socialization x Cognitive Behavioral  Reality Testing    Activity  Movement  Media    Other:   Urinalysis  Motivational Interviewing     Urinalysis Submitted Today? No    Urinalysis Results: None    If Positive, for which substance:    Date of Urinalysis Results Review: Urinalysis Results Signed by Client?  Yes, 6/17/2019        Response to Learning:  x Able to verbalize current knowledge/experience    Able to verbalize/acknowledge new learning    Able to retain information    Capable of insight    Able to change behavior    Progressing to goal    Other:          Topics

## 2019-07-01 ENCOUNTER — HOSPITAL ENCOUNTER (OUTPATIENT)
Dept: PSYCHIATRY | Age: 29
Setting detail: THERAPIES SERIES
Discharge: HOME | End: 2019-07-01
Payer: COMMERCIAL

## 2019-07-01 PROCEDURE — 80305 DRUG TEST PRSMV DIR OPT OBS: CPT

## 2019-07-01 NOTE — PROGRESS NOTES
Cognitive Skills / Relapse Prevention / Recovery Group Note    Client Name: Eli Montoya        Type(s) of Service Delivery:    62776 Group Therapy 80 Min  Case Mgmt  63769 Urinalysis   X 78382 Group Therapy 60 x1  04751 Individual/Family 30 Min  97061 Urinalysis (Tramadol)    Group Therapy 30 Min  89567 Individual/Family 61 Min  Other       Date:  7/01/2019      Start Time: 4120       End Time: 6787    Number Participants in Group:  4    Client Attended Support Group Meeting:  Yes    If so, how many meetings since last treatment session? 2    Change Noted in Client's behavior, action, and/or statements as it relates to harming self or other? no        Goal:  Patient will demonstrate uninterrupted sobriety throughout 6 recommended sessions of treatment          Topic: Developing skills to maintain sobriety. Participation Level:    None  Minimal   x Active Listener x Interactive    Monopolizing         Participation Quality:   x Appropriate  Inappropriate   x       Attentive        Intrusive   x       Sharing        Resistant          Supportive        Lethargic       Affective:    x Congruent  Incongruent  Blunted  Flat    Constricted  Anxious  Elated  Angry    Labile  Depressed  Other         Cognitive:   x Alert  Oriented PPTP         Modes of Intervention:    Education  Support  Exploration    Clarifying x Person-Centered  Confrontation    Socialization x Cognitive Behavioral  Reality Testing    Activity  Movement  Media    Other:   Urinalysis  Motivational Interviewing     Urinalysis Submitted Today? Yes    Urinalysis Results: None    If Positive, for which substance:    Date of Urinalysis Results Review: Urinalysis Results Signed by Client?  No        Response to Learning:   Able to verbalize current knowledge/experience    Able to verbalize/acknowledge new learning    Able to retain information    Capable of insight    Able to change behavior    Progressing to goal    Other:          Topics for today's

## 2019-07-02 NOTE — DISCHARGE SUMMARY
attended the sessions as required, developed skills to make healthy choices to avoid abusing substances and avoid all illicit substance use. Client has a sponsor and working to increase her sober supports being active in the recovery community. Recommendations and/or Referral for Additional AOD Addiction Treatment or other services: [facility]. Client is recommended to continue attending 2 sober support group meetings each week to aid in the recovery process.             Electronically signed by Harjit Hodge RN on 7/2/2019 at 11:03 AM

## 2019-07-08 ENCOUNTER — APPOINTMENT (OUTPATIENT)
Dept: PSYCHIATRY | Age: 29
End: 2019-07-08
Payer: COMMERCIAL

## 2019-07-15 ENCOUNTER — APPOINTMENT (OUTPATIENT)
Dept: PSYCHIATRY | Age: 29
End: 2019-07-15
Payer: COMMERCIAL

## 2019-07-22 ENCOUNTER — APPOINTMENT (OUTPATIENT)
Dept: PSYCHIATRY | Age: 29
End: 2019-07-22
Payer: COMMERCIAL

## 2019-07-29 ENCOUNTER — APPOINTMENT (OUTPATIENT)
Dept: PSYCHIATRY | Age: 29
End: 2019-07-29
Payer: COMMERCIAL

## 2019-12-30 ENCOUNTER — HOSPITAL ENCOUNTER (OUTPATIENT)
Age: 29
Discharge: HOME | End: 2019-12-30
Attending: LEGAL MEDICINE | Admitting: LEGAL MEDICINE
Payer: COMMERCIAL

## 2019-12-30 ENCOUNTER — APPOINTMENT (OUTPATIENT)
Dept: LABOR AND DELIVERY | Age: 29
End: 2019-12-30
Payer: COMMERCIAL

## 2019-12-30 VITALS
TEMPERATURE: 98.8 F | SYSTOLIC BLOOD PRESSURE: 124 MMHG | RESPIRATION RATE: 18 BRPM | DIASTOLIC BLOOD PRESSURE: 75 MMHG | HEART RATE: 93 BPM

## 2019-12-30 PROCEDURE — 59025 FETAL NON-STRESS TEST: CPT

## 2020-01-03 ENCOUNTER — ANESTHESIA (OUTPATIENT)
Dept: LABOR AND DELIVERY | Age: 30
DRG: 560 | End: 2020-01-03
Payer: COMMERCIAL

## 2020-01-03 ENCOUNTER — ANESTHESIA EVENT (OUTPATIENT)
Dept: LABOR AND DELIVERY | Age: 30
DRG: 560 | End: 2020-01-03
Payer: COMMERCIAL

## 2020-01-03 ENCOUNTER — HOSPITAL ENCOUNTER (INPATIENT)
Age: 30
LOS: 3 days | Discharge: HOME | DRG: 560 | End: 2020-01-06
Attending: LEGAL MEDICINE | Admitting: LEGAL MEDICINE
Payer: COMMERCIAL

## 2020-01-03 ENCOUNTER — APPOINTMENT (OUTPATIENT)
Dept: LABOR AND DELIVERY | Age: 30
DRG: 560 | End: 2020-01-03
Payer: COMMERCIAL

## 2020-01-03 PROBLEM — Z34.93 NORMAL PREGNANCY IN THIRD TRIMESTER: Status: ACTIVE | Noted: 2020-01-03

## 2020-01-03 LAB
ABO/RH: NORMAL
ALBUMIN SERPL-MCNC: 3.4 G/DL (ref 3.5–5.2)
ALP BLD-CCNC: 240 U/L (ref 35–104)
ALT SERPL-CCNC: 6 U/L (ref 0–32)
AMPHETAMINE SCREEN, URINE: NOT DETECTED
ANION GAP SERPL CALCULATED.3IONS-SCNC: 15 MMOL/L (ref 7–16)
ANTIBODY SCREEN: NORMAL
AST SERPL-CCNC: 13 U/L (ref 0–31)
BARBITURATE SCREEN URINE: NOT DETECTED
BENZODIAZEPINE SCREEN, URINE: NOT DETECTED
BILIRUB SERPL-MCNC: 0.3 MG/DL (ref 0–1.2)
BUN BLDV-MCNC: 6 MG/DL (ref 6–20)
CALCIUM SERPL-MCNC: 9.2 MG/DL (ref 8.6–10.2)
CANNABINOID SCREEN URINE: NOT DETECTED
CHLORIDE BLD-SCNC: 107 MMOL/L (ref 98–107)
CO2: 20 MMOL/L (ref 22–29)
COCAINE METABOLITE SCREEN URINE: NOT DETECTED
CREAT SERPL-MCNC: 0.4 MG/DL (ref 0.5–1)
FENTANYL SCREEN, URINE: NOT DETECTED
GFR AFRICAN AMERICAN: >60
GFR NON-AFRICAN AMERICAN: >60 ML/MIN/1.73
GLUCOSE BLD-MCNC: 175 MG/DL (ref 74–99)
HCT VFR BLD CALC: 33.6 % (ref 34–48)
HEMOGLOBIN: 11.4 G/DL (ref 11.5–15.5)
Lab: NORMAL
MCH RBC QN AUTO: 32.5 PG (ref 26–35)
MCHC RBC AUTO-ENTMCNC: 33.9 % (ref 32–34.5)
MCV RBC AUTO: 95.7 FL (ref 80–99.9)
METER GLUCOSE: 112 MG/DL (ref 74–99)
METER GLUCOSE: 113 MG/DL (ref 74–99)
METER GLUCOSE: 122 MG/DL (ref 74–99)
METER GLUCOSE: 87 MG/DL (ref 74–99)
METHADONE SCREEN, URINE: NOT DETECTED
OPIATE SCREEN URINE: NOT DETECTED
OXYCODONE URINE: NOT DETECTED
PDW BLD-RTO: 13.6 FL (ref 11.5–15)
PHENCYCLIDINE SCREEN URINE: NOT DETECTED
PLATELET # BLD: 176 E9/L (ref 130–450)
PMV BLD AUTO: 10.5 FL (ref 7–12)
POTASSIUM SERPL-SCNC: 3.3 MMOL/L (ref 3.5–5)
RBC # BLD: 3.51 E12/L (ref 3.5–5.5)
SODIUM BLD-SCNC: 142 MMOL/L (ref 132–146)
TOTAL PROTEIN: 6.1 G/DL (ref 6.4–8.3)
WBC # BLD: 8.8 E9/L (ref 4.5–11.5)

## 2020-01-03 PROCEDURE — 86901 BLOOD TYPING SEROLOGIC RH(D): CPT

## 2020-01-03 PROCEDURE — 2500000003 HC RX 250 WO HCPCS: Performed by: ANESTHESIOLOGY

## 2020-01-03 PROCEDURE — 85027 COMPLETE CBC AUTOMATED: CPT

## 2020-01-03 PROCEDURE — 1220000001 HC SEMI PRIVATE L&D R&B

## 2020-01-03 PROCEDURE — 6360000002 HC RX W HCPCS

## 2020-01-03 PROCEDURE — 82962 GLUCOSE BLOOD TEST: CPT

## 2020-01-03 PROCEDURE — 80053 COMPREHEN METABOLIC PANEL: CPT

## 2020-01-03 PROCEDURE — 86850 RBC ANTIBODY SCREEN: CPT

## 2020-01-03 PROCEDURE — 80307 DRUG TEST PRSMV CHEM ANLYZR: CPT

## 2020-01-03 PROCEDURE — 86900 BLOOD TYPING SEROLOGIC ABO: CPT

## 2020-01-03 PROCEDURE — 2500000003 HC RX 250 WO HCPCS: Performed by: NURSE ANESTHETIST, CERTIFIED REGISTERED

## 2020-01-03 PROCEDURE — 2580000003 HC RX 258: Performed by: LEGAL MEDICINE

## 2020-01-03 PROCEDURE — 36415 COLL VENOUS BLD VENIPUNCTURE: CPT

## 2020-01-03 PROCEDURE — 3700000025 EPIDURAL BLOCK: Performed by: ANESTHESIOLOGY

## 2020-01-03 RX ORDER — ONDANSETRON 2 MG/ML
4 INJECTION INTRAMUSCULAR; INTRAVENOUS EVERY 6 HOURS PRN
Status: DISCONTINUED | OUTPATIENT
Start: 2020-01-03 | End: 2020-01-04

## 2020-01-03 RX ORDER — LIDOCAINE HYDROCHLORIDE 10 MG/ML
INJECTION, SOLUTION INFILTRATION; PERINEURAL PRN
Status: DISCONTINUED | OUTPATIENT
Start: 2020-01-03 | End: 2020-01-04 | Stop reason: SDUPTHER

## 2020-01-03 RX ORDER — NALOXONE HYDROCHLORIDE 0.4 MG/ML
0.4 INJECTION, SOLUTION INTRAMUSCULAR; INTRAVENOUS; SUBCUTANEOUS PRN
Status: DISCONTINUED | OUTPATIENT
Start: 2020-01-03 | End: 2020-01-04

## 2020-01-03 RX ORDER — LIDOCAINE HYDROCHLORIDE 10 MG/ML
30 INJECTION, SOLUTION EPIDURAL; INFILTRATION; INTRACAUDAL; PERINEURAL PRN
Status: DISCONTINUED | OUTPATIENT
Start: 2020-01-03 | End: 2020-01-04

## 2020-01-03 RX ORDER — SODIUM CHLORIDE 0.9 % (FLUSH) 0.9 %
10 SYRINGE (ML) INJECTION EVERY 12 HOURS SCHEDULED
Status: DISCONTINUED | OUTPATIENT
Start: 2020-01-03 | End: 2020-01-04

## 2020-01-03 RX ORDER — DEXTROSE, SODIUM CHLORIDE, SODIUM LACTATE, POTASSIUM CHLORIDE, AND CALCIUM CHLORIDE 5; .6; .31; .03; .02 G/100ML; G/100ML; G/100ML; G/100ML; G/100ML
INJECTION, SOLUTION INTRAVENOUS CONTINUOUS
Status: DISCONTINUED | OUTPATIENT
Start: 2020-01-03 | End: 2020-01-04

## 2020-01-03 RX ORDER — LIDOCAINE HYDROCHLORIDE 10 MG/ML
INJECTION, SOLUTION EPIDURAL; INFILTRATION; INTRACAUDAL; PERINEURAL
Status: DISCONTINUED
Start: 2020-01-03 | End: 2020-01-04

## 2020-01-03 RX ORDER — NALBUPHINE HCL 10 MG/ML
5 AMPUL (ML) INJECTION EVERY 4 HOURS PRN
Status: DISCONTINUED | OUTPATIENT
Start: 2020-01-03 | End: 2020-01-04

## 2020-01-03 RX ORDER — DOCUSATE SODIUM 100 MG/1
100 CAPSULE, LIQUID FILLED ORAL 2 TIMES DAILY
Status: DISCONTINUED | OUTPATIENT
Start: 2020-01-03 | End: 2020-01-04

## 2020-01-03 RX ORDER — SODIUM CHLORIDE, SODIUM LACTATE, POTASSIUM CHLORIDE, CALCIUM CHLORIDE 600; 310; 30; 20 MG/100ML; MG/100ML; MG/100ML; MG/100ML
INJECTION, SOLUTION INTRAVENOUS CONTINUOUS
Status: DISCONTINUED | OUTPATIENT
Start: 2020-01-03 | End: 2020-01-04

## 2020-01-03 RX ORDER — SODIUM CHLORIDE 0.9 % (FLUSH) 0.9 %
10 SYRINGE (ML) INJECTION PRN
Status: DISCONTINUED | OUTPATIENT
Start: 2020-01-03 | End: 2020-01-04

## 2020-01-03 RX ADMIN — SODIUM CHLORIDE, SODIUM LACTATE, POTASSIUM CHLORIDE, CALCIUM CHLORIDE AND DEXTROSE MONOHYDRATE: 5; 600; 310; 30; 20 INJECTION, SOLUTION INTRAVENOUS at 10:25

## 2020-01-03 RX ADMIN — Medication 1 MILLI-UNITS/MIN: at 12:10

## 2020-01-03 RX ADMIN — LIDOCAINE HYDROCHLORIDE 3 ML: 10 INJECTION, SOLUTION INFILTRATION; PERINEURAL at 21:38

## 2020-01-03 RX ADMIN — SODIUM CHLORIDE, POTASSIUM CHLORIDE, SODIUM LACTATE AND CALCIUM CHLORIDE: 600; 310; 30; 20 INJECTION, SOLUTION INTRAVENOUS at 11:40

## 2020-01-03 RX ADMIN — Medication 15 ML/HR: at 21:52

## 2020-01-03 RX ADMIN — LIDOCAINE HYDROCHLORIDE 5 ML: 10; .005 INJECTION, SOLUTION EPIDURAL; INFILTRATION; INTRACAUDAL; PERINEURAL at 21:56

## 2020-01-03 RX ADMIN — Medication 15 ML: at 21:52

## 2020-01-03 ASSESSMENT — PAIN SCALES - GENERAL: PAINLEVEL_OUTOF10: 1
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
## 2020-01-03 NOTE — PROGRESS NOTES
Patient here for scheduled IOL. Taken to room and oriented, no co's offered. Perceives fetal movement. Denies contractions bleeding or leaking of fluid. Plan of care reviewed.

## 2020-01-03 NOTE — PROGRESS NOTES
Care of patient assumed at bedside and nurse to nurse report received. poc discussed. Patient currently on 1 mu pitocin and is to remain at 1 mu until updated by Dr Alexander Spurjairo to increase. fhts continuously being monitored with charting every 15 minutes while on pitocin. Patient comfortable at this time. Contractions palpated and toco readjusted.   No needs at this time

## 2020-01-03 NOTE — PROGRESS NOTES
Dr. Solomon Jiménez called unit, aware of elevated glucose level. Wants IVF switched to LR instead of LR with D5, then to complete accu-check in one hour after switching over IVF and after that every 2 hours. Notify her if glucose is > 120. See new orders.

## 2020-01-03 NOTE — H&P
[id number]
auscultation. CV:  Regular rate and rhythm. BREASTS:  Fibrocystic. ABDOMEN:  Gravid, soft, and nontender. Estimated fetal weight is 4000  gm. Fetal heart tones are present in the 140s. Cervix is closed, long,  vertex, posterior, soft. EXTREMITIES:  No clubbing or cyanosis. 2+ edema. NEURO:  CN II through XII are grossly intact. She is alert and oriented  x4. LABORATORY DATA:  Potassium is 3.3, creatinine 0.4, glucose 175, ALT 6,  and AST 13.  White count 8.8, hemoglobin 11.4, and platelets 330,505. UA is pending. ASSESSMENT:  Intrauterine pregnancy at 40 weeks and 4 days, declining  further expectant management of pregnancy. Grand multip. Noncompliant  with performance of one-hour GCT, with glucose 175 on CMP, non fasting. Normal A1C and glucose on first prenatal visit. Suspected macrosomia. Previous drug screens positive  for marijuana. PLAN:  Risks of vaginal delivery and operative delivery have been  reviewed with her. Indications for operative delivery have been  reviewed with her. Shoulder dystocia and birth trauma have been  reviewed with her. Option of  has been reviewed and she has  declined. All her questions have been answered, and she wishes to  proceed with attempt at vaginal delivery.         Zoya Velasquez MD    D: 2020 11:24:14       T: 2020 12:08:15     [email or web address]  Job#: [id number]     Doc#: [id number]    CC:

## 2020-01-03 NOTE — PROGRESS NOTES
Spoke with [employer]  regarding consult.  Requesting new order for SS consult be placed after delivery

## 2020-01-04 LAB
METER GLUCOSE: 120 MG/DL (ref 74–99)
METER GLUCOSE: 86 MG/DL (ref 74–99)

## 2020-01-04 PROCEDURE — 2500000003 HC RX 250 WO HCPCS: Performed by: NURSE ANESTHETIST, CERTIFIED REGISTERED

## 2020-01-04 PROCEDURE — 6360000002 HC RX W HCPCS: Performed by: LEGAL MEDICINE

## 2020-01-04 PROCEDURE — 6370000000 HC RX 637 (ALT 250 FOR IP): Performed by: LEGAL MEDICINE

## 2020-01-04 PROCEDURE — 88307 TISSUE EXAM BY PATHOLOGIST: CPT

## 2020-01-04 PROCEDURE — 1220000001 HC SEMI PRIVATE L&D R&B

## 2020-01-04 PROCEDURE — 2500000003 HC RX 250 WO HCPCS: Performed by: ANESTHESIOLOGY

## 2020-01-04 PROCEDURE — 82962 GLUCOSE BLOOD TEST: CPT

## 2020-01-04 PROCEDURE — 3700000025 EPIDURAL BLOCK: Performed by: ANESTHESIOLOGY

## 2020-01-04 PROCEDURE — 2580000003 HC RX 258: Performed by: LEGAL MEDICINE

## 2020-01-04 PROCEDURE — 7200000001 HC VAGINAL DELIVERY

## 2020-01-04 RX ORDER — SODIUM CHLORIDE 0.9 % (FLUSH) 0.9 %
10 SYRINGE (ML) INJECTION EVERY 12 HOURS SCHEDULED
Status: DISCONTINUED | OUTPATIENT
Start: 2020-01-04 | End: 2020-01-06 | Stop reason: HOSPADM

## 2020-01-04 RX ORDER — EPHEDRINE SULFATE 50 MG/ML
INJECTION INTRAVENOUS PRN
Status: DISCONTINUED | OUTPATIENT
Start: 2020-01-04 | End: 2020-01-04 | Stop reason: SDUPTHER

## 2020-01-04 RX ORDER — EPHEDRINE SULFATE/0.9% NACL/PF 50 MG/5 ML
SYRINGE (ML) INTRAVENOUS
Status: DISCONTINUED
Start: 2020-01-04 | End: 2020-01-04

## 2020-01-04 RX ORDER — IBUPROFEN 400 MG/1
400 TABLET ORAL EVERY 6 HOURS PRN
Status: DISCONTINUED | OUTPATIENT
Start: 2020-01-04 | End: 2020-01-06 | Stop reason: HOSPADM

## 2020-01-04 RX ORDER — METHYLERGONOVINE MALEATE 0.2 MG/ML
200 INJECTION INTRAVENOUS
Status: ACTIVE | OUTPATIENT
Start: 2020-01-04 | End: 2020-01-04

## 2020-01-04 RX ORDER — DOCUSATE SODIUM 100 MG/1
100 CAPSULE, LIQUID FILLED ORAL 2 TIMES DAILY
Status: DISCONTINUED | OUTPATIENT
Start: 2020-01-04 | End: 2020-01-06 | Stop reason: HOSPADM

## 2020-01-04 RX ORDER — CARBOPROST TROMETHAMINE 250 UG/ML
250 INJECTION, SOLUTION INTRAMUSCULAR
Status: ACTIVE | OUTPATIENT
Start: 2020-01-04 | End: 2020-01-04

## 2020-01-04 RX ORDER — SODIUM CHLORIDE 0.9 % (FLUSH) 0.9 %
10 SYRINGE (ML) INJECTION PRN
Status: DISCONTINUED | OUTPATIENT
Start: 2020-01-04 | End: 2020-01-06 | Stop reason: HOSPADM

## 2020-01-04 RX ORDER — ACETAMINOPHEN 500 MG
1000 TABLET ORAL EVERY 6 HOURS PRN
Status: DISCONTINUED | OUTPATIENT
Start: 2020-01-04 | End: 2020-01-06 | Stop reason: HOSPADM

## 2020-01-04 RX ORDER — LANOLIN 100 %
OINTMENT (GRAM) TOPICAL PRN
Status: DISCONTINUED | OUTPATIENT
Start: 2020-01-04 | End: 2020-01-06 | Stop reason: HOSPADM

## 2020-01-04 RX ORDER — OXYCODONE HYDROCHLORIDE 5 MG/1
5 TABLET ORAL EVERY 4 HOURS PRN
Status: DISCONTINUED | OUTPATIENT
Start: 2020-01-04 | End: 2020-01-06 | Stop reason: HOSPADM

## 2020-01-04 RX ORDER — SODIUM CHLORIDE, SODIUM LACTATE, POTASSIUM CHLORIDE, CALCIUM CHLORIDE 600; 310; 30; 20 MG/100ML; MG/100ML; MG/100ML; MG/100ML
INJECTION, SOLUTION INTRAVENOUS CONTINUOUS
Status: DISCONTINUED | OUTPATIENT
Start: 2020-01-04 | End: 2020-01-06 | Stop reason: HOSPADM

## 2020-01-04 RX ORDER — FERROUS SULFATE 325(65) MG
325 TABLET ORAL
Status: DISCONTINUED | OUTPATIENT
Start: 2020-01-04 | End: 2020-01-06 | Stop reason: HOSPADM

## 2020-01-04 RX ADMIN — EPHEDRINE SULFATE 10 MG: 50 INJECTION, SOLUTION INTRAVENOUS at 04:54

## 2020-01-04 RX ADMIN — IBUPROFEN 400 MG: 400 TABLET ORAL at 20:54

## 2020-01-04 RX ADMIN — DOCUSATE SODIUM 100 MG: 100 CAPSULE, LIQUID FILLED ORAL at 20:47

## 2020-01-04 RX ADMIN — Medication 15 ML/HR: at 04:34

## 2020-01-04 RX ADMIN — Medication 15 ML: at 04:26

## 2020-01-04 RX ADMIN — DOCUSATE SODIUM 100 MG: 100 CAPSULE, LIQUID FILLED ORAL at 09:26

## 2020-01-04 RX ADMIN — EPHEDRINE SULFATE 10 MG: 50 INJECTION, SOLUTION INTRAVENOUS at 04:46

## 2020-01-04 RX ADMIN — EPHEDRINE SULFATE 15 MG: 50 INJECTION, SOLUTION INTRAVENOUS at 04:49

## 2020-01-04 RX ADMIN — SODIUM CHLORIDE, POTASSIUM CHLORIDE, SODIUM LACTATE AND CALCIUM CHLORIDE: 600; 310; 30; 20 INJECTION, SOLUTION INTRAVENOUS at 07:00

## 2020-01-04 RX ADMIN — Medication 999 MILLI-UNITS/MIN: at 06:49

## 2020-01-04 RX ADMIN — IBUPROFEN 400 MG: 400 TABLET ORAL at 10:01

## 2020-01-04 ASSESSMENT — PAIN SCALES - GENERAL
PAINLEVEL_OUTOF10: 0
PAINLEVEL_OUTOF10: 3
PAINLEVEL_OUTOF10: 3

## 2020-01-04 NOTE — OP NOTE
[address]                                OPERATIVE REPORT    PATIENT NAME: Ramona Long                    :        1990  MED REC NO:   [id number]                            ROOM:       LD12  ACCOUNT NO:   [de-identified]                           ADMIT DATE: 2020. PROVIDER:     Rosa Alfonso MD    DATE OF PROCEDURE:  2020    SURGEON:  Rosa Alfonso MD    The patient felt the urge to push. She was placed in Dorie  position. She was able to deliver the fetal head. Thick meconium was  noted. Fetal trunk was delivered with minimal traction upright in an  axis parallel to the fetal spine after the shoulder had cleared the  pubic bone. Infant was crying. Nares and hypopharynx were suctioned. Cord was clamped and cut. Infant continued to cry and was vigorous. Cord gases and blood samples were obtained. Placenta was removed  spontaneously. A note was made of an intact two artery, one vein cord  placenta, which was sent to Pathology. No cervical or vaginal  lacerations were noted. Fundus was firm. Estimated blood loss 300 mL. The cord arterial gas pH is 7.233, base excess -3.7. Cord venous gas pH  is 7.307, base excess -4.2. Pediatrician is Rosy Escalante. Mother and  infant went to recovery room in stable condition.         Alice oDn MD    D: 2020 7:35:12       T: 2020 7:57:34     [email or web address]  Job#: [id number]     Doc#: [id number]    CC:

## 2020-01-04 NOTE — ANESTHESIA PROCEDURE NOTES
Epidural Block    Patient location during procedure: OB  Start time: 1/4/2020 4:10 AM  End time: 1/4/2020 4:31 AM  Reason for block: labor epidural  Staffing  Anesthesiologist: Rayne Navarro MD  Resident/CRNA: BECKA Belle - CRNA  Performed: resident/CRNA   Preanesthetic Checklist  Completed: patient identified, site marked, surgical consent, pre-op evaluation, timeout performed, IV checked, risks and benefits discussed, monitors and equipment checked, anesthesia consent given, oxygen available and patient being monitored  Epidural  Patient position: sitting  Prep: Betadine and site prepped and draped  Patient monitoring: cardiac monitor, continuous pulse ox and frequent blood pressure checks  Approach: midline  Location: lumbar (1-5)  Injection technique: JESSENIA air and JESSENIA saline  Provider prep: mask and sterile gloves  Needle  Needle type: Tuohy   Needle gauge: 18 G  Needle length: 2.5 in  Needle insertion depth: 6 cm  Catheter type: end hole  Catheter size: 20 G. Catheter at skin depth: 10 cm  Test dose: negative  Assessment  Sensory level: T4  Hemodynamics: stable  Attempts: 1  Additional Notes  RN notified that patient continuously in pain and is requesting a new epidural be put in as she experiencing the same amount of pain as before placement of the epidural.  Epidural pump stopped at 0415. The First epidural catheter was removed tip intact. A new epidural was placed under sterile technique without difficulty. Epidural pump restarted at 0431.  VSS

## 2020-01-04 NOTE — PROGRESS NOTES
Department of Anesthesiology  Continuous Labor Epidural Note - Periodic Monitoring Note    Name:  Eda Machado                                         Age: 30 y.o. MRN:  [id number]  Obstetrician:  No name on file. Last Pain Score: (Charted in Doc Flowsheet)  Pain Level: 5    /64   Pulse 83   Temp 36.6 °C (97.9 °F) (Oral)   Resp 17   Ht 5' 1\" (1.549 m)   Wt 175 lb (79.4 kg)   BMI 33.07 kg/m²     A/P: Eda Machado is a 30y.o. year-old at 39w6d with continuous labor epidural and good pain control - No: Patient complaining of pain 6/10 on pain scale. Epidural catheter intact. 10 ml bolus given 0.25% Marcaine PF and 100 mcg Fentanyl intermittently with aspiration.   Patient tolerated well, vital signs stable.;    BECKA Mathew - CRNA1/4/20202:03 AM

## 2020-01-04 NOTE — PROGRESS NOTES
Late decelerations and marked variability noted on the FHR tracing. Patient 10/100%/+1 station. Dr. Gavi Gómez notified.

## 2020-01-04 NOTE — ANESTHESIA PROCEDURE NOTES
Epidural Block    Patient location during procedure: OB  Start time: 1/3/2020 9:30 PM  End time: 1/3/2020 10:03 PM  Reason for block: labor epidural  Staffing  Anesthesiologist: Vanita Reid MD  Resident/CRNA: BECKA Costello - CRNA  Performed: resident/CRNA   Preanesthetic Checklist  Completed: patient identified, site marked, surgical consent, pre-op evaluation, timeout performed, IV checked, risks and benefits discussed, monitors and equipment checked, anesthesia consent given, oxygen available and patient being monitored  Epidural  Patient position: sitting  Prep: Betadine  Patient monitoring: continuous pulse ox and frequent blood pressure checks  Approach: midline  Location: lumbar (1-5)  Injection technique: JESSENIA air  Provider prep: mask and sterile gloves  Needle  Needle type: Tuohy   Needle gauge: 17 G  Needle length: 3.5 in  Needle insertion depth: 6 cm  Catheter type: end hole  Catheter at skin depth: 12 cm  Test dose: negative  Assessment  Sensory level: T4  Hemodynamics: stable  Attempts: 1 Information could not be obtained
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
## 2020-01-04 NOTE — PROGRESS NOTES
Prolonged deceleration noted on the FHR tracing. IV fluid bolus given, patient placed on left side. Patient's blood pressure low, IV ephedrine given by ASIF Manzo CRNA. FHR returned to baseline.

## 2020-01-04 NOTE — PROGRESS NOTES
Patient complaining of increased pain. Cervical exam performed, patient 6/80%/-2 station.  Zahira Jones CRNA notified for possible repeat epidural.

## 2020-01-04 NOTE — ANESTHESIA PRE PROCEDURE
Department of Anesthesiology  Preprocedure Note       Name:  Nader Ponce   Age:  30 y.o.  :  1990                                          MRN:  [id number]         Date:  1/3/2020      Surgeon: * No surgeons listed *    Procedure: Labor Analgesia    Medications prior to admission:   Prior to Admission medications    Not on File       Current medications:    Current Facility-Administered Medications   Medication Dose Route Frequency Provider Last Rate Last Dose    dextrose 5 % in lactated ringers infusion   Intravenous Continuous Marissa Jensen MD   Stopped at 20 1140    lactated ringers infusion   Intravenous Continuous Marissa Jensen  mL/hr at 20 2113 999 mL/hr at 20 2113    sodium chloride flush 0.9 % injection 10 mL  10 mL Intravenous 2 times per day Marissa Jensen MD        sodium chloride flush 0.9 % injection 10 mL  10 mL Intravenous PRN Marissa Jensen MD        lidocaine PF 1 % injection 30 mL  30 mL Other PRN Marissa Jensen MD        hydrocortisone 2.5 % cream   Topical Q2H PRN Marissa Jensen MD        oxytocin (PITOCIN) 30 units in 500 mL infusion  1 sofia-units/min Intravenous Continuous Marissa Jensen MD 1 mL/hr at 20 1530 1 sofia-units/min at 20 1530    docusate sodium (COLACE) capsule 100 mg  100 mg Oral BID Marissa Jensen MD        ondansetron (ZOFRAN) injection 4 mg  4 mg Intravenous Q6H PRN Marissa Jensen MD        oxytocin (PITOCIN) 30 units in 500 mL infusion  1 sofia-units/min Intravenous Continuous PRN Marissa Jensen MD 4 mL/hr at 20 2100 4 sofia-units/min at 20 2100    [START ON 2020] influenza quadrivalent split vaccine (FLUZONE;FLUARIX;FLULAVAL;AFLURIA) injection 0.5 mL  0.5 mL Intramuscular Once Marissa Jensen MD        lidocaine PF 1 % injection             naloxone (NARCAN) injection 0.4 mg  0.4 mg Intravenous PRN Brittany Prieto MD        nalbuphine (NUBAIN) injection 5 mg  5 mg Intravenous Q4H PRN

## 2020-01-05 PROBLEM — Z37.9 NORMAL LABOR: Status: ACTIVE | Noted: 2020-01-05

## 2020-01-05 LAB — HEMOGLOBIN: 9.5 G/DL (ref 11.5–15.5)

## 2020-01-05 PROCEDURE — 36415 COLL VENOUS BLD VENIPUNCTURE: CPT

## 2020-01-05 PROCEDURE — 6370000000 HC RX 637 (ALT 250 FOR IP): Performed by: LEGAL MEDICINE

## 2020-01-05 PROCEDURE — 1220000001 HC SEMI PRIVATE L&D R&B

## 2020-01-05 PROCEDURE — 85018 HEMOGLOBIN: CPT

## 2020-01-05 RX ADMIN — FERROUS SULFATE TAB 325 MG (65 MG ELEMENTAL FE) 325 MG: 325 (65 FE) TAB at 17:54

## 2020-01-05 RX ADMIN — DOCUSATE SODIUM 100 MG: 100 CAPSULE, LIQUID FILLED ORAL at 19:49

## 2020-01-05 RX ADMIN — FERROUS SULFATE TAB 325 MG (65 MG ELEMENTAL FE) 325 MG: 325 (65 FE) TAB at 13:20

## 2020-01-05 RX ADMIN — DOCUSATE SODIUM 100 MG: 100 CAPSULE, LIQUID FILLED ORAL at 08:52

## 2020-01-05 RX ADMIN — FERROUS SULFATE TAB 325 MG (65 MG ELEMENTAL FE) 325 MG: 325 (65 FE) TAB at 08:52

## 2020-01-05 RX ADMIN — IBUPROFEN 400 MG: 400 TABLET ORAL at 17:57

## 2020-01-05 RX ADMIN — OXYCODONE HYDROCHLORIDE 5 MG: 5 TABLET ORAL at 19:42

## 2020-01-05 ASSESSMENT — PAIN SCALES - GENERAL
PAINLEVEL_OUTOF10: 3
PAINLEVEL_OUTOF10: 6
PAINLEVEL_OUTOF10: 0

## 2020-01-05 NOTE — DISCHARGE SUMMARY
[address]                               DISCHARGE SUMMARY    PATIENT NAME: Khoa Jimenez                    :        1990  MED REC NO:   [id number]                            ROOM:       LD12  ACCOUNT NO:   [de-identified]                           ADMIT DATE: 2020. PROVIDER:     Tor Brown MD                  DISCHARGE DATE:      ADMITTING DIAGNOSES:  Intrauterine pregnancy at 40-1/2 weeks, declining  further expectant management of pregnancy. DISCHARGE DIAGNOSES:  Intrauterine pregnancy at 40-1/2 weeks, declining  further expectant management of pregnancy. PROCEDURES PERFORMED:  Cervical ripening, induction, and vaginal  delivery. HOSPITAL COURSE IN DETAIL:  The patient is doing well. Voiding well. Minimal lochia. Pain is under adequate control. Accu-Cheks during  labor were in the [de-identified]. Her admission labs revealed a white count of  8.8, hemoglobin 11.4, platelets 956,668. Postpartum day-1 hemoglobin  was 9.5. Admission glucose of 175 was obtained when the patient had  just drunk a can of Coke and had eaten pancakes with a large amount of  maple syrup. Followup glucoses have been within normal limits. She is  afebrile. Heart rate was 87-79, blood pressure 117-127 over 78-77, O2  sat 99% on room air. Fundus was firm and 2 fingerbreadths below the  umbilicus. Perineum dry and intact. ASSESSMENT:  The patient is doing well, postpartum day-1, wishes to go  home today. PLAN:  Home today once meets discharge criteria with fever, bleeding,  emboli, lifting, climbing, driving precautions. She is to follow up at  the office in 6 weeks. She indicates understanding of all instructions.         Dejan Harman MD    D: 2020 9:21:03       T: 2020 10:59:08     [email or web address]  Job#: [id number]     Doc#: [id number]    CC:

## 2020-01-06 VITALS
WEIGHT: 175 LBS | DIASTOLIC BLOOD PRESSURE: 79 MMHG | OXYGEN SATURATION: 99 % | TEMPERATURE: 98.4 F | HEIGHT: 61 IN | RESPIRATION RATE: 16 BRPM | HEART RATE: 80 BPM | SYSTOLIC BLOOD PRESSURE: 126 MMHG | BODY MASS INDEX: 33.04 KG/M2

## 2020-01-06 PROCEDURE — 6370000000 HC RX 637 (ALT 250 FOR IP): Performed by: LEGAL MEDICINE

## 2020-01-06 PROCEDURE — 6360000002 HC RX W HCPCS: Performed by: LEGAL MEDICINE

## 2020-01-06 PROCEDURE — 90715 TDAP VACCINE 7 YRS/> IM: CPT | Performed by: LEGAL MEDICINE

## 2020-01-06 PROCEDURE — 90471 IMMUNIZATION ADMIN: CPT | Performed by: LEGAL MEDICINE

## 2020-01-06 RX ADMIN — IBUPROFEN 400 MG: 400 TABLET ORAL at 07:54

## 2020-01-06 RX ADMIN — FERROUS SULFATE TAB 325 MG (65 MG ELEMENTAL FE) 325 MG: 325 (65 FE) TAB at 07:49

## 2020-01-06 RX ADMIN — DOCUSATE SODIUM 100 MG: 100 CAPSULE, LIQUID FILLED ORAL at 07:49

## 2020-01-06 RX ADMIN — TETANUS TOXOID, REDUCED DIPHTHERIA TOXOID AND ACELLULAR PERTUSSIS VACCINE, ADSORBED 0.5 ML: 5; 2.5; 8; 8; 2.5 SUSPENSION INTRAMUSCULAR at 12:56

## 2020-01-06 ASSESSMENT — PAIN SCALES - GENERAL: PAINLEVEL_OUTOF10: 5

## 2020-01-06 NOTE — PROGRESS NOTES
Assumed care of patient. Plan of care discussed and agreed upon. Patient complaining of pain and medicated at this time. Assessment completed and charted. All needs addressed.

## 2020-01-06 NOTE — PROGRESS NOTES
Discharge instructions reviewed with patient. Verbalized understanding. No questions voiced when asked. Teach back and printed material used.

## 2020-01-06 NOTE — CARE COORDINATION
SS note:  Notified by Lela Paez intake screener for [facility] that agency did screen out referral and were not going to open a case for ongoing services and NO HOLD on  for d/c however informed Lela Paez that per nursing Kallie Morgan was found asleep with her baby in the bed early this morning and again about an hour ago, Lela Paez will write these concerns up as a \"new report\" but will need to wait for their supervisor to review if a case will now be opened for agency to follow however it would now be up to the hospital/physician to d/c baby today or wait for their determination in am, sw met with Kallie Morgan who was walking in her room holding , explained situation and she relayed an understanding of the dangers of falling asleep with her baby in bed with her and of safe sleep practices for . Nursing informed. Electronically signed by NENA Rubio on 2020 at 4:27 PM

## 2020-01-06 NOTE — CARE COORDINATION
SS Note:  Contacted [facility] Intake screener for [facility] regarding pt and  d/c, she relayed that agency is still reviewing referral information and awaiting on a determination if case is being assigned for on going services, there is still a HOLD on  for d/c until agency completes their investigation, nursing notified, sw to follow.  Electronically signed by NENA Grover on 2020 at 12:23 PM